# Patient Record
Sex: FEMALE | Race: WHITE | NOT HISPANIC OR LATINO | Employment: UNEMPLOYED | URBAN - NONMETROPOLITAN AREA
[De-identification: names, ages, dates, MRNs, and addresses within clinical notes are randomized per-mention and may not be internally consistent; named-entity substitution may affect disease eponyms.]

---
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
## 2024-09-17 ENCOUNTER — MEDICAL CORRESPONDENCE (OUTPATIENT)
Dept: HEALTH INFORMATION MANAGEMENT | Facility: CLINIC | Age: 24
End: 2024-09-17
Payer: COMMERCIAL

## 2024-09-17 ENCOUNTER — TRANSCRIBE ORDERS (OUTPATIENT)
Dept: OTHER | Age: 24
End: 2024-09-17

## 2024-09-17 DIAGNOSIS — M25.532 LEFT WRIST PAIN: ICD-10-CM

## 2024-09-17 DIAGNOSIS — R29.898 HAND WEAKNESS: ICD-10-CM

## 2024-09-17 DIAGNOSIS — M79.642 PAIN OF LEFT HAND: Primary | ICD-10-CM

## 2024-09-20 NOTE — TELEPHONE ENCOUNTER
Action 9/20/2024   Action Taken 1) Requested XR from [facility] via fax      REASON FOR VISIT: Pain of left hand, Left wrist pain, Hand weakness    DATE OF APPT: 10/15/2024   NOTES (FOR ALL VISITS) STATUS DETAILS   OFFICE NOTE from referring provider Received [facility]   Edgar De Jesus MD 9/17/2024   MEDICATION LIST N/A    IMAGING  (FOR ALL VISITS)     XR In process    MRI (HEAD, NECK, SPINE) N/A    CT (HEAD, NECK, SPINE) N/A

## 2024-10-07 ENCOUNTER — TELEPHONE (OUTPATIENT)
Dept: ORTHOPEDICS | Facility: CLINIC | Age: 24
End: 2024-10-07
Payer: COMMERCIAL

## 2024-10-07 NOTE — TELEPHONE ENCOUNTER
10/7 Called and left voicemail, explained we need to reschedule upcoming appointment with Dr. Aguilar and reschedule to Dr. Briggs or any other hand surgeon.     Provided phone number [phone] to reschedule.     [facility]   Orthopedics, Podiatry, Sports Medicine, Ent ,Eye , Audiology, Adult Endocrine & Diabetes, Nutrition & Medication Therapy Management Specialties   [facility]        ----- Message from Ryan HALL sent at 10/7/2024 10:26 AM CDT -----  Regarding: R/S to Dr. Briggs or other ortho hand surgeon  This Pt is on with Dr. Aguilar next week and needs to be rescheduled since its mostly wrist related. She can see Dr. Briggs or any other ortho hand surgeon, next available.     Ongoing left wrist pain, numbness and tingling, h/o ORIF left wrist 2018 ([facility])    Jose Angel Lino, RNCC

## 2024-10-09 NOTE — TELEPHONE ENCOUNTER
10/9 Called and left voicemail, explained we need to reschedule upcoming appointment with Dr. Aguilar and reschedule to Dr. Briggs or any other hand surgeon.      Provided phone number [phone] to reschedule.      [facility]   Orthopedics, Podiatry, Sports Medicine, Ent ,Eye , Audiology, Adult Endocrine & Diabetes, Nutrition & Medication Therapy Management Specialties   [facility]          Topical Retinoid counseling:  Patient advised to apply a pea-sized amount only at bedtime and wait 30 minutes after washing their face before applying.  If too drying, patient may add a non-comedogenic moisturizer. The patient verbalized understanding of the proper use and possible adverse effects of retinoids.  All of the patient's questions and concerns were addressed.

## 2024-10-10 NOTE — TELEPHONE ENCOUNTER
10/10 Called and left voicemail, explained we need to reschedule upcoming appointment with Dr. Aguilar and reschedule to Dr. Briggs or any other hand surgeon.      Provided phone number [phone] to reschedule.      [facility]   Orthopedics, Podiatry, Sports Medicine, Ent ,Eye , Audiology, Adult Endocrine & Diabetes, Nutrition & Medication Therapy Management Specialties   [facility]

## 2024-10-14 NOTE — TELEPHONE ENCOUNTER
Action 10/14/2024   Action Taken 1) Requested XR Wrist left via tax from [facility]      REASON FOR VISIT: Pain of left hand, Left wrist pain, Hand weakness    DATE OF APPT: 11/29/2024   NOTES (FOR ALL VISITS) STATUS DETAILS   OFFICE NOTE from referring provider Received [facility]  Edgar De Jesus MD 9/17/2024   EMG N/A    MEDICATION LIST N/A    IMAGING  (FOR ALL VISITS)     XR In process [facility]  XR Wrist left 8/19/2024   MRI (HEAD, NECK, SPINE) N/A    CT (HEAD, NECK, SPINE) N/A

## 2024-10-15 ENCOUNTER — PRE VISIT (OUTPATIENT)
Dept: ORTHOPEDICS | Facility: CLINIC | Age: 24
End: 2024-10-15

## 2024-11-15 NOTE — TELEPHONE ENCOUNTER
REASON FOR VISIT: Pain of left hand, Left wrist pain, Hand weakness    DATE OF APPT: 11/22/2024   NOTES (FOR ALL VISITS) STATUS DETAILS   OFFICE NOTE from referring provider Received [facility]  Edgar De Jesus MD 9/17/2024   EMG N/A    MEDICATION LIST N/A    IMAGING  (FOR ALL VISITS)     XR Received [facility]  XR Wrist left 8/19/2024   MRI (HEAD, NECK, SPINE) N/A    CT (HEAD, NECK, SPINE) N/A

## 2024-11-22 ENCOUNTER — TELEPHONE (OUTPATIENT)
Dept: ORTHOPEDICS | Facility: CLINIC | Age: 24
End: 2024-11-22

## 2024-11-22 ENCOUNTER — OFFICE VISIT (OUTPATIENT)
Dept: ORTHOPEDICS | Facility: CLINIC | Age: 24
End: 2024-11-22
Payer: COMMERCIAL

## 2024-11-22 ENCOUNTER — PRE VISIT (OUTPATIENT)
Dept: ORTHOPEDICS | Facility: CLINIC | Age: 24
End: 2024-11-22

## 2024-11-22 DIAGNOSIS — M25.532 LEFT WRIST PAIN: Primary | ICD-10-CM

## 2024-11-22 NOTE — NURSING NOTE
"Pre-Operative Teaching Flowsheet     Person(s) involved in teaching: Patient     Motivation Level:  Receptive (willing/able to accept information) and asks appropriate questions where applicable: Yes  Any cultural factors/Latter day beliefs that may influence understanding or compliance? No     Patient demonstrates understanding of the following:  Pre-operative planning, including the necessary appointments and preparation needed prior to surgery: Yes  Which situations necessitate calling provider and whom to contact: Yes  Pain management techniques pre and post op: Yes  Stoplight tool introduced, questions answered, patient expressed understanding: Yes  How, and when, to access community resources: Yes  Discussed appropriate and safe discharge to home: Yes  Patient has a designated  for surgery and \"\" to stay with them after: Yes    Additional Teaching Concerns Addressed:  Post-operative living arrangements and necessary adaptations to living environment.  Instructional Materials Used/Given: Yes, pre-op packet given to patient with additional system forms added as needed depending on type of surgery. Pre-op soap given (if in clinic).     Time spent with patient: 20 minutes.    Jose Angel Lino RNCC  "

## 2024-11-22 NOTE — TELEPHONE ENCOUNTER
Pt will likely be able to follow up locally, but we should schedule a follow up visit just in case.     Procedure: Left distal radius hardware removal  Facility:  or [facility]  Length: ? minutes  Anesthesia: ?  Post-op appointments needed: 2 weeks with surgeon or PA, 6 weeks with surgeon only.  Surgery packet/instructions given to patient?  Yes     Jose Angel Lino RNCC

## 2024-11-22 NOTE — LETTER
11/22/2024      Nataliia Sofia  [address]  [place] [address]      Dear Colleague,    Thank you for referring your patient, Nataliia Sofia, to the [facility]. Please see a copy of my visit note below.    Ortho Hand    HPI: 24 year-old ambi F 1 PPD smoker s/p remote L DR ORIF with dorsal plate p/w L wrist pain. She had a fracture of the distal radius in 2018/19. She did reasonably well and then started experiencing pain 1-year after surgery. The pain is in the dorsal wrist and constant at 3/10. It is exacerbated with activities. No paresthesias. The surgery was done by Dr. Mendoza at [facility]. No recent injuries.     ROS: Negative, see HPI  PMH: Nondiabetic  PSH: As above  Medications: No blood thinners  Allergies: None  SH: Smokes 1 PPD, can stop before surgery for 4 weeks before and after surgery  FH: No bleeding or clotting issues, or problems with anesthesia    Examination:  Nonlabored breathing  Not distressed  Tenderness over the L dorsoradial distal radius and the 3rd/4th extensor compartments and with resisted digit extension  L active wrist extension exacerbates pain and limited to 40-45 deg    XR: Healed L DR fracture with prominent dorsal plate with no evidence of hardware failure    A/P: 24F RHD S hx L DR ORIF with dorsal plate p/w symptomatic hardware, extensor tenosynovitis    -Discussed options for management. As long as the patient can stop smoking, my recommendation is plate removal with possible tenosynovectomy as needed. Explained that the plate is slightly prominent and is contributing to tendinitis that if untreated can lead to tendon rupture. Patient is agreeable with the plan.   -Discussed the risks of surgery, including but not limited to: infection, bleeding, pain, poor scarring, wound healing issues, injury to nerves and/or tendons, neuroma formation, recurrence of the condition including pain, need for revision or additional surgery, retained hardware,  iatrogenic fracture, complex regional pain syndrome, anesthesia-related complication, DVT/PE, death. Despite these risks, patient consents to LEFT wrist deep hardware removal, possible extensor tenosynovectomy. All questions answered.   -Counseled patient on smoking cessation for 4 weeks before and after surgery. Patient is agreeable.   -Case request placed  -MAC with block  -We will make sure we have the [place] Variax set available  -We will plan to use resorbable suture and have some of the follow-up virtually since [place].   -A total of 45 minutes was devoted to review of chart, direct face-to-face patient counseling and documentation during and on the day of this encounter, exclusive of any procedure performed.    Shayne Briggs MD, PhD, FACS      Again, thank you for allowing me to participate in the care of your patient.        Sincerely,        Shayne Briggs MD

## 2024-11-25 NOTE — TELEPHONE ENCOUNTER
Left voicemail regarding scheduling surgery with Dr. Briggs. Provided direct phone number for patient to call.      Petrona Zarate on 11/25/2024 at 8:50 AM

## 2024-11-25 NOTE — PROGRESS NOTES
Ortho Hand    HPI: 24 year-old ambi F 1 PPD smoker s/p remote L DR ORIF with dorsal plate p/w L wrist pain. She had a fracture of the distal radius in 2018/19. She did reasonably well and then started experiencing pain 1-year after surgery. The pain is in the dorsal wrist and constant at 3/10. It is exacerbated with activities. No paresthesias. The surgery was done by Dr. Mendoza at [facility]. No recent injuries.     ROS: Negative, see HPI  PMH: Nondiabetic  PSH: As above  Medications: No blood thinners  Allergies: None  SH: Smokes 1 PPD, can stop before surgery for 4 weeks before and after surgery  FH: No bleeding or clotting issues, or problems with anesthesia    Examination:  Nonlabored breathing  Not distressed  Tenderness over the L dorsoradial distal radius and the 3rd/4th extensor compartments and with resisted digit extension  L active wrist extension exacerbates pain and limited to 40-45 deg    XR: Healed L DR fracture with prominent dorsal plate with no evidence of hardware failure    A/P: 24F RHD S hx L DR ORIF with dorsal plate p/w symptomatic hardware, extensor tenosynovitis    -Discussed options for management. As long as the patient can stop smoking, my recommendation is plate removal with possible tenosynovectomy as needed. Explained that the plate is slightly prominent and is contributing to tendinitis that if untreated can lead to tendon rupture. Patient is agreeable with the plan.   -Discussed the risks of surgery, including but not limited to: infection, bleeding, pain, poor scarring, wound healing issues, injury to nerves and/or tendons, neuroma formation, recurrence of the condition including pain, need for revision or additional surgery, retained hardware, iatrogenic fracture, complex regional pain syndrome, anesthesia-related complication, DVT/PE, death. Despite these risks, patient consents to LEFT wrist deep hardware removal, possible extensor tenosynovectomy. All questions answered.    -Counseled patient on smoking cessation for 4 weeks before and after surgery. Patient is agreeable.   -Case request placed  -MAC with block  -We will make sure we have the [place] Variax set available  -We will plan to use resorbable suture and have some of the follow-up virtually since [place].   -A total of 45 minutes was devoted to review of chart, direct face-to-face patient counseling and documentation during and on the day of this encounter, exclusive of any procedure performed.    Shayne Briggs MD, PhD, FACS

## 2024-11-29 ENCOUNTER — PRE VISIT (OUTPATIENT)
Dept: ORTHOPEDICS | Facility: CLINIC | Age: 24
End: 2024-11-29

## 2024-11-29 PROBLEM — M25.532 LEFT WRIST PAIN: Status: ACTIVE | Noted: 2024-11-22

## 2025-02-15 ENCOUNTER — HEALTH MAINTENANCE LETTER (OUTPATIENT)
Age: 25
End: 2025-02-15

## 2025-03-26 ENCOUNTER — TELEPHONE (OUTPATIENT)
Dept: PLASTIC SURGERY | Facility: CLINIC | Age: 25
End: 2025-03-26
Payer: COMMERCIAL

## 2025-03-26 NOTE — TELEPHONE ENCOUNTER
Other: Patient has a stomach infection and is starting a 10 day antibiotic today primary said she should reschedule surgery would like to speak to care team      [facility] or would you prefer to receive a phone call?:   Patient would prefer a phone call   Okay to leave a detailed message?: Yes at Cell number on file:    Telephone Information:   Mobile [phone]

## 2025-03-27 NOTE — TELEPHONE ENCOUNTER
Plan: Left distal radius hardware removal, DOS: 4/3/25    Diagnosed with c-diff and start on abx yesterday (10 day course). I advised that her provider is correct that we do not want to do elective surgery with any active infections, especially one as contagious as c-diff. She was agreeable. Will look at rescheduling until after her abx are complete, which will also allow her to get another pre-op physical.    Jose Angel Lino, RNCC

## 2025-03-27 NOTE — TELEPHONE ENCOUNTER
Left voicemail for patient regarding rescheduling surgery with Dr. Briggs on 4/3    Provided direct contact line to discuss scheduling  P: [phone]    Gisele Brice on 3/27/2025 at 10:58 AM

## 2025-03-27 NOTE — TELEPHONE ENCOUNTER
Received voicemail from: Nataliia White voicemail for patient regarding rescheduling surgery with Dr. Briggs    Noted time is held on 4/17    Requested patient call back to discuss, provided direct line  P: [phone]    Gisele Brice on 3/27/2025 at 11:41 AM

## 2025-03-27 NOTE — TELEPHONE ENCOUNTER
Received call to reschedule surgery with: Dr. Briggs    Spoke with: Nataliia     Date of Surgery: 4/17    Estimated Arrival time Discussed with Patient:   5:45 AM    Location of surgery: [facility]    Pre-operative H&P: patient confirmed they will schedule with Edgar De Jesus MD    Post-operative Appointment w/AYDIN or Surgeon: Dr. Briggs 4/29 at 11:00 AM    Additional Appointments: N/A, no additional visits requested    Discussed with patient pre-op RN will call 2-3 days prior to surgery with arrival time and instructions:  Yes     Standard Ortho Surgery Packet: Yes  3/27/25 via Mail - Standard    All patients questions were answered and was instructed to contact the clinic with any questions or concerns.     Additional Comments: Mailing address updated, previously incorrect            Gisele Birce on 3/27/2025 at 1:54 PM

## 2025-04-16 ENCOUNTER — ANESTHESIA EVENT (OUTPATIENT)
Dept: SURGERY | Facility: AMBULATORY SURGERY CENTER | Age: 25
End: 2025-04-16
Payer: COMMERCIAL

## 2025-04-16 ASSESSMENT — LIFESTYLE VARIABLES: TOBACCO_USE: 1

## 2025-04-16 NOTE — ANESTHESIA PREPROCEDURE EVALUATION
"Anesthesia Pre-Procedure Evaluation    Patient: Nataliia Sofia   MRN: [id number] : 2000        Procedure : Procedure(s):  LEFT wrist deep hardware removal          No past medical history on file.   No past surgical history on file.   No Known Allergies   Social History     Tobacco Use     Smoking status: Not on file     Smokeless tobacco: Not on file   Substance Use Topics     Alcohol use: Not on file      Wt Readings from Last 1 Encounters:   No data found for Wt        Anesthesia Evaluation   Pt has had prior anesthetic. Type: General.        ROS/MED HX  ENT/Pulmonary:     (+)                tobacco use, Current use,                       Neurologic:  - neg neurologic ROS     Cardiovascular:  - neg cardiovascular ROS     METS/Exercise Tolerance: >4 METS    Hematologic:  - neg hematologic  ROS     Musculoskeletal:  - neg musculoskeletal ROS     GI/Hepatic: Comment: Hx of C. Diff      Renal/Genitourinary:  - neg Renal ROS     Endo:       Psychiatric/Substance Use: Comment: adhd    (+) psychiatric history anxiety   Recreational drug usage: Cannabis (3 times per week).    Infectious Disease:  - neg infectious disease ROS     Malignancy:  - neg malignancy ROS     Other:  - neg other ROS          Physical Exam    Airway        Mallampati: II   TM distance: > 3 FB   Neck ROM: full   Mouth opening: > 3 cm    Respiratory Devices and Support         Dental       (+) Completely normal teeth      Cardiovascular   cardiovascular exam normal       Rhythm and rate: regular and normal     Pulmonary   pulmonary exam normal        breath sounds clear to auscultation       OUTSIDE LABS:  CBC: No results found for: \"WBC\", \"HGB\", \"HCT\", \"PLT\"  BMP: No results found for: \"NA\", \"POTASSIUM\", \"CHLORIDE\", \"CO2\", \"BUN\", \"CR\", \"GLC\"  COAGS: No results found for: \"PTT\", \"INR\", \"FIBR\"  POC: No results found for: \"BGM\", \"HCG\", \"HCGS\"  HEPATIC: No results found for: \"ALBUMIN\", \"PROTTOTAL\", \"ALT\", \"AST\", \"GGT\", \"ALKPHOS\", \"BILITOTAL\", " "\"BILIDIRECT\", \"GEORGE\"  OTHER: No results found for: \"PH\", \"LACT\", \"A1C\", \"AUTUMN\", \"PHOS\", \"MAG\", \"LIPASE\", \"AMYLASE\", \"TSH\", \"T4\", \"T3\", \"CRP\", \"SED\"    Anesthesia Plan    ASA Status:  2    NPO Status:  NPO Appropriate    Anesthesia Type: Peripheral Nerve Block.              Consents    Anesthesia Plan(s) and associated risks, benefits, and realistic alternatives discussed. Questions answered and patient/representative(s) expressed understanding.     - Discussed:     - Discussed with:  Patient      - Extended Intubation/Ventilatory Support Discussed: No.      - Patient is DNR/DNI Status: No     Use of blood products discussed: No .     Postoperative Care    Pain management: Multi-modal analgesia.   PONV prophylaxis: Background Propofol Infusion     Comments:               Luma Gregory MD    Clinically Significant Risk Factors Present on Admission                                          "

## 2025-04-17 ENCOUNTER — ANCILLARY PROCEDURE (OUTPATIENT)
Dept: RADIOLOGY | Facility: AMBULATORY SURGERY CENTER | Age: 25
End: 2025-04-17
Attending: STUDENT IN AN ORGANIZED HEALTH CARE EDUCATION/TRAINING PROGRAM
Payer: COMMERCIAL

## 2025-04-17 ENCOUNTER — ANESTHESIA (OUTPATIENT)
Dept: SURGERY | Facility: AMBULATORY SURGERY CENTER | Age: 25
End: 2025-04-17
Payer: COMMERCIAL

## 2025-04-17 ENCOUNTER — HOSPITAL ENCOUNTER (OUTPATIENT)
Facility: AMBULATORY SURGERY CENTER | Age: 25
Discharge: HOME | End: 2025-04-17
Attending: STUDENT IN AN ORGANIZED HEALTH CARE EDUCATION/TRAINING PROGRAM
Payer: COMMERCIAL

## 2025-04-17 VITALS
HEART RATE: 78 BPM | BODY MASS INDEX: 25.69 KG/M2 | TEMPERATURE: 97 F | SYSTOLIC BLOOD PRESSURE: 150 MMHG | OXYGEN SATURATION: 93 % | DIASTOLIC BLOOD PRESSURE: 89 MMHG | WEIGHT: 145 LBS | HEIGHT: 63 IN | RESPIRATION RATE: 16 BRPM

## 2025-04-17 DIAGNOSIS — M25.532 LEFT WRIST PAIN: Primary | ICD-10-CM

## 2025-04-17 DIAGNOSIS — M25.532 LEFT WRIST PAIN: ICD-10-CM

## 2025-04-17 LAB
HCG UR QL: NEGATIVE
INTERNAL QC OK POCT: NORMAL
POCT KIT EXPIRATION DATE: NORMAL
POCT KIT LOT NUMBER: NORMAL

## 2025-04-17 PROCEDURE — 88305 TISSUE EXAM BY PATHOLOGIST: CPT | Mod: TC | Performed by: STUDENT IN AN ORGANIZED HEALTH CARE EDUCATION/TRAINING PROGRAM

## 2025-04-17 PROCEDURE — 88305 TISSUE EXAM BY PATHOLOGIST: CPT | Mod: 26 | Performed by: STUDENT IN AN ORGANIZED HEALTH CARE EDUCATION/TRAINING PROGRAM

## 2025-04-17 RX ORDER — GLYCOPYRROLATE 0.2 MG/ML
INJECTION, SOLUTION INTRAMUSCULAR; INTRAVENOUS PRN
Status: DISCONTINUED | OUTPATIENT
Start: 2025-04-17 | End: 2025-04-17

## 2025-04-17 RX ORDER — FENTANYL CITRATE 50 UG/ML
50 INJECTION, SOLUTION INTRAMUSCULAR; INTRAVENOUS EVERY 5 MIN PRN
Status: ACTIVE | OUTPATIENT
Start: 2025-04-17

## 2025-04-17 RX ORDER — ONDANSETRON 2 MG/ML
4 INJECTION INTRAMUSCULAR; INTRAVENOUS EVERY 30 MIN PRN
Status: ACTIVE | OUTPATIENT
Start: 2025-04-17

## 2025-04-17 RX ORDER — PROPOFOL 10 MG/ML
INJECTION, EMULSION INTRAVENOUS CONTINUOUS PRN
Status: DISCONTINUED | OUTPATIENT
Start: 2025-04-17 | End: 2025-04-17

## 2025-04-17 RX ORDER — OXYCODONE HYDROCHLORIDE 5 MG/1
5 TABLET ORAL
Status: ACTIVE | OUTPATIENT
Start: 2025-04-17

## 2025-04-17 RX ORDER — FENTANYL CITRATE 50 UG/ML
25-50 INJECTION, SOLUTION INTRAMUSCULAR; INTRAVENOUS
Status: ACTIVE | OUTPATIENT
Start: 2025-04-17

## 2025-04-17 RX ORDER — ACETAMINOPHEN 325 MG/1
975 TABLET ORAL ONCE
Status: COMPLETED | OUTPATIENT
Start: 2025-04-17 | End: 2025-04-17

## 2025-04-17 RX ORDER — ONDANSETRON 2 MG/ML
INJECTION INTRAMUSCULAR; INTRAVENOUS PRN
Status: DISCONTINUED | OUTPATIENT
Start: 2025-04-17 | End: 2025-04-17

## 2025-04-17 RX ORDER — SODIUM CHLORIDE, SODIUM LACTATE, POTASSIUM CHLORIDE, CALCIUM CHLORIDE 600; 310; 30; 20 MG/100ML; MG/100ML; MG/100ML; MG/100ML
INJECTION, SOLUTION INTRAVENOUS CONTINUOUS
Status: ACTIVE | OUTPATIENT
Start: 2025-04-17

## 2025-04-17 RX ORDER — OXYCODONE HYDROCHLORIDE 5 MG/1
10 TABLET ORAL
Status: ACTIVE | OUTPATIENT
Start: 2025-04-17

## 2025-04-17 RX ORDER — NALOXONE HYDROCHLORIDE 0.4 MG/ML
0.2 INJECTION, SOLUTION INTRAMUSCULAR; INTRAVENOUS; SUBCUTANEOUS
Status: ACTIVE | OUTPATIENT
Start: 2025-04-17

## 2025-04-17 RX ORDER — KETAMINE HYDROCHLORIDE 10 MG/ML
INJECTION INTRAMUSCULAR; INTRAVENOUS PRN
Status: DISCONTINUED | OUTPATIENT
Start: 2025-04-17 | End: 2025-04-17

## 2025-04-17 RX ORDER — OXYCODONE HYDROCHLORIDE 5 MG/1
5 TABLET ORAL EVERY 6 HOURS PRN
Qty: 12 TABLET | Refills: 0 | Status: SHIPPED | OUTPATIENT
Start: 2025-04-17 | End: 2025-04-20

## 2025-04-17 RX ORDER — ONDANSETRON 4 MG/1
4 TABLET, ORALLY DISINTEGRATING ORAL EVERY 30 MIN PRN
Status: ACTIVE | OUTPATIENT
Start: 2025-04-17

## 2025-04-17 RX ORDER — CEFAZOLIN SODIUM 2 G/50ML
2 SOLUTION INTRAVENOUS SEE ADMIN INSTRUCTIONS
Status: ACTIVE | OUTPATIENT
Start: 2025-04-17

## 2025-04-17 RX ORDER — BUPIVACAINE HYDROCHLORIDE AND EPINEPHRINE 5; 5 MG/ML; UG/ML
INJECTION, SOLUTION PERINEURAL
Status: COMPLETED | OUTPATIENT
Start: 2025-04-17 | End: 2025-04-17

## 2025-04-17 RX ORDER — LIDOCAINE HYDROCHLORIDE 20 MG/ML
INJECTION, SOLUTION INFILTRATION; PERINEURAL PRN
Status: DISCONTINUED | OUTPATIENT
Start: 2025-04-17 | End: 2025-04-17

## 2025-04-17 RX ORDER — HYDROMORPHONE HYDROCHLORIDE 1 MG/ML
0.2 INJECTION, SOLUTION INTRAMUSCULAR; INTRAVENOUS; SUBCUTANEOUS EVERY 5 MIN PRN
Status: ACTIVE | OUTPATIENT
Start: 2025-04-17

## 2025-04-17 RX ORDER — DEXAMETHASONE SODIUM PHOSPHATE 10 MG/ML
4 INJECTION, SOLUTION INTRAMUSCULAR; INTRAVENOUS
Status: ACTIVE | OUTPATIENT
Start: 2025-04-17

## 2025-04-17 RX ORDER — CEFAZOLIN SODIUM 2 G/50ML
2 SOLUTION INTRAVENOUS
Status: COMPLETED | OUTPATIENT
Start: 2025-04-17 | End: 2025-04-17

## 2025-04-17 RX ORDER — NALOXONE HYDROCHLORIDE 0.4 MG/ML
0.1 INJECTION, SOLUTION INTRAMUSCULAR; INTRAVENOUS; SUBCUTANEOUS
Status: ACTIVE | OUTPATIENT
Start: 2025-04-17

## 2025-04-17 RX ORDER — NALOXONE HYDROCHLORIDE 0.4 MG/ML
0.4 INJECTION, SOLUTION INTRAMUSCULAR; INTRAVENOUS; SUBCUTANEOUS
Status: ACTIVE | OUTPATIENT
Start: 2025-04-17

## 2025-04-17 RX ORDER — FENTANYL CITRATE 50 UG/ML
25 INJECTION, SOLUTION INTRAMUSCULAR; INTRAVENOUS EVERY 5 MIN PRN
Status: ACTIVE | OUTPATIENT
Start: 2025-04-17

## 2025-04-17 RX ORDER — DEXAMETHASONE SODIUM PHOSPHATE 4 MG/ML
INJECTION, SOLUTION INTRA-ARTICULAR; INTRALESIONAL; INTRAMUSCULAR; INTRAVENOUS; SOFT TISSUE PRN
Status: DISCONTINUED | OUTPATIENT
Start: 2025-04-17 | End: 2025-04-17

## 2025-04-17 RX ORDER — HYDROMORPHONE HYDROCHLORIDE 1 MG/ML
0.4 INJECTION, SOLUTION INTRAMUSCULAR; INTRAVENOUS; SUBCUTANEOUS EVERY 5 MIN PRN
Status: ACTIVE | OUTPATIENT
Start: 2025-04-17

## 2025-04-17 RX ORDER — FLUMAZENIL 0.1 MG/ML
0.2 INJECTION, SOLUTION INTRAVENOUS
Status: ACTIVE | OUTPATIENT
Start: 2025-04-17

## 2025-04-17 RX ORDER — LIDOCAINE 40 MG/G
CREAM TOPICAL
Status: ACTIVE | OUTPATIENT
Start: 2025-04-17

## 2025-04-17 RX ORDER — CEPHALEXIN 500 MG/1
500 CAPSULE ORAL 4 TIMES DAILY
Qty: 12 CAPSULE | Refills: 0 | Status: SHIPPED | OUTPATIENT
Start: 2025-04-17

## 2025-04-17 RX ORDER — ONDANSETRON 4 MG/1
4 TABLET, FILM COATED ORAL EVERY 6 HOURS PRN
Qty: 12 TABLET | Refills: 0 | Status: SHIPPED | OUTPATIENT
Start: 2025-04-17

## 2025-04-17 RX ADMIN — DEXAMETHASONE SODIUM PHOSPHATE 4 MG: 4 INJECTION, SOLUTION INTRA-ARTICULAR; INTRALESIONAL; INTRAMUSCULAR; INTRAVENOUS; SOFT TISSUE at 07:45

## 2025-04-17 RX ADMIN — GLYCOPYRROLATE 0.2 MG: 0.2 INJECTION, SOLUTION INTRAMUSCULAR; INTRAVENOUS at 07:32

## 2025-04-17 RX ADMIN — SODIUM CHLORIDE, SODIUM LACTATE, POTASSIUM CHLORIDE, CALCIUM CHLORIDE: 600; 310; 30; 20 INJECTION, SOLUTION INTRAVENOUS at 07:21

## 2025-04-17 RX ADMIN — CEFAZOLIN SODIUM 2 G: 2 SOLUTION INTRAVENOUS at 07:35

## 2025-04-17 RX ADMIN — SODIUM CHLORIDE, SODIUM LACTATE, POTASSIUM CHLORIDE, CALCIUM CHLORIDE: 600; 310; 30; 20 INJECTION, SOLUTION INTRAVENOUS at 06:52

## 2025-04-17 RX ADMIN — LIDOCAINE HYDROCHLORIDE 40 MG: 20 INJECTION, SOLUTION INFILTRATION; PERINEURAL at 07:29

## 2025-04-17 RX ADMIN — FENTANYL CITRATE 50 MCG: 50 INJECTION, SOLUTION INTRAMUSCULAR; INTRAVENOUS at 07:28

## 2025-04-17 RX ADMIN — ACETAMINOPHEN 975 MG: 325 TABLET ORAL at 06:30

## 2025-04-17 RX ADMIN — BUPIVACAINE HYDROCHLORIDE AND EPINEPHRINE 20 ML: 5; 5 INJECTION, SOLUTION PERINEURAL at 07:00

## 2025-04-17 RX ADMIN — ONDANSETRON 4 MG: 2 INJECTION INTRAMUSCULAR; INTRAVENOUS at 07:45

## 2025-04-17 RX ADMIN — FENTANYL CITRATE 50 MCG: 50 INJECTION, SOLUTION INTRAMUSCULAR; INTRAVENOUS at 06:59

## 2025-04-17 RX ADMIN — KETAMINE HYDROCHLORIDE 20 MG: 10 INJECTION INTRAMUSCULAR; INTRAVENOUS at 07:32

## 2025-04-17 RX ADMIN — PROPOFOL 150 MCG/KG/MIN: 10 INJECTION, EMULSION INTRAVENOUS at 07:28

## 2025-04-17 NOTE — ANESTHESIA PROCEDURE NOTES
Brachial plexus Procedure Note    Pre-Procedure   Staff -        Anesthesiologist:  Luma Gregory MD       Performed By: anesthesiologist       Location: pre-op       Procedure Start/Stop Times: 4/17/2025 7:00 AM and 4/17/2025 7:10 AM       Pre-Anesthestic Checklist: patient identified, IV checked, site marked, risks and benefits discussed, informed consent, monitors and equipment checked, pre-op evaluation, at physician/surgeon's request and post-op pain management  Timeout:       Correct Patient: Yes        Correct Procedure: Yes        Correct Site: Yes        Correct Position: Yes        Correct Laterality: Yes        Site Marked: Yes  Procedure Documentation  Procedure: Brachial plexus         Laterality: left       Patient Position: sitting       Skin prep: Chloraprep (supraclavicular approach).       Needle Gauge: 21.        Needle Length (Inches): 4        Ultrasound guided       1. Ultrasound was used to identify targeted nerve, plexus, vascular marker, or fascial plane and place a needle adjacent to it in real-time.       2. Ultrasound was used to visualize the spread of anesthetic in close proximity to the above referenced structure.       3. A permanent image is entered into the patient's record.    Assessment/Narrative         The placement was negative for: blood aspirated, painful injection and site bleeding       Paresthesias: No.       Bolus given via needle..        Secured via.        Insertion/Infusion Method: Single Shot    Medication(s) Administered   Bupivacaine 0.5% w/ 1:400K Epi (Injection) - Injection   15 mL - 4/17/2025 7:00:00 AM  Bupivacaine 0.5% w/ 1:200K Epi (Other) - Other   20 mL - 4/17/2025 7:00:00 AM  Bupivacaine liposome (Exparel) 1.3% LA inj susp (Infiltration) - Infiltration   10 mL - 4/17/2025 7:00:00 AM  Medication Administration Time: 4/17/2025 7:00 AM     Comments:  133mg of exparel used      FOR [facility] ONLY:   Pain Team Contact information: please page  "the Pain Team Via [facility]. Search \"Pain\". During daytime hours, please page the attending first. At night please page the resident first.      "

## 2025-04-17 NOTE — OR NURSING
Patient received left side Brachial Plexus nerve block  with Exparel.  Fentanyl 50mcg and Versed 2mg given. Tolerated procedure well.     Anjelica Jaffe RN

## 2025-04-17 NOTE — ANESTHESIA CARE TRANSFER NOTE
Patient: Nataliia Sofia    Procedure: Procedure(s):  LEFT wrist deep hardware removal, second extensor tenosynevectomy       Diagnosis: Left wrist pain [M25.532]  Diagnosis Additional Information: No value filed.    Anesthesia Type:   Peripheral Nerve Block     Note:    Oropharynx: oropharynx clear of all foreign objects  Level of Consciousness: awake  Oxygen Supplementation: room air    Independent Airway: airway patency satisfactory and stable  Dentition: dentition unchanged  Vital Signs Stable: post-procedure vital signs reviewed and stable  Report to RN Given: handoff report given  Patient transferred to: Phase II    Handoff Report: Identifed the Patient, Identified the Reponsible Provider, Reviewed the pertinent medical history, Discussed the surgical course, Reviewed Intra-OP anesthesia mangement and issues during anesthesia, Set expectations for post-procedure period and Allowed opportunity for questions and acknowledgement of understanding      Vitals:  Vitals Value Taken Time   BP     Temp 25.3  C (77.54  F) 04/17/25 0830   Pulse     Resp     SpO2 99 % 04/17/25 0830   Vitals shown include unfiled device data.    Electronically Signed By: PALLAVI Walters CRNA  April 17, 2025  8:31 AM

## 2025-04-17 NOTE — PROGRESS NOTES
Ortho Hand    No changes in history or examination.  Medically optimized.  On exam, no left hand digital or thumb extensor lag or difficulty with active extension.     OR today for left wrist deep hardware removal.    Discussed the risks of surgery, including but not limited to: infection, bleeding, pain, poor scarring, wound healing issues, injury to nerves and/or tendons, neuroma formation, recurrence of the condition including pain, need for revision or additional surgery, retained hardware, iatrogenic fracture, complex regional pain syndrome, anesthesia-related complication, DVT/PE, death. Despite these risks, patient consents to LEFT wrist deep hardware removal, possible extensor tenosynovectomy. All questions answered.     Shayne Briggs MD, PhD, FACS

## 2025-04-17 NOTE — ANESTHESIA POSTPROCEDURE EVALUATION
Patient: Nataliia Sofia    Procedure: Procedure(s):  LEFT wrist deep hardware removal, second extensor tenosynevectomy       Anesthesia Type:  Peripheral Nerve Block    Note:  Disposition: Outpatient   Postop Pain Control: Uneventful            Sign Out: Well controlled pain   PONV: No   Neuro/Psych: Uneventful            Sign Out: Acceptable/Baseline neuro status   Airway/Respiratory: Uneventful            Sign Out: Acceptable/Baseline resp. status   CV/Hemodynamics: Uneventful            Sign Out: Acceptable CV status; No obvious hypovolemia; No obvious fluid overload   Other NRE: NONE   DID A NON-ROUTINE EVENT OCCUR? No       Last vitals:  Vitals Value Taken Time   /89 04/17/25 0845   Temp 25.5  C (77.9  F) 04/17/25 0856   Pulse 78 04/17/25 0845   Resp 16 04/17/25 0845   SpO2 95 % 04/17/25 0851   Vitals shown include unfiled device data.    Electronically Signed By: Luma Gregory MD  April 17, 2025  8:57 AM

## 2025-04-17 NOTE — OP NOTE
HAND SURGERY OPERATIVE REPORT     Date of Surgery: 4/17/2025  Surgical Service: Ortho Hand     Preoperative Diagnosis:   1. Left wrist pain  2. Status post left distal radius open reduction and internal fixation with dorsal plate     Postoperative Diagnosis: Same as preoperative diagnoses     Procedures Performed:   Left dorsal distal radius plate removal (deep wrist hardware)  Left second extensor tenosynovectomy     Attending:  CARLA Briggs MD, PhD, FACS  Assistant: None     Complications: None apparent  Specimens: Left second extensor tenosynovium for permanent pathology  Implants: None  Estimated blood loss: < 5 mL  Tourniquet time: 15 minutes  Drains: None  Wound classification: Clean  Anesthesia: MAC with block     Indications for Procedure: 24-year-old female with history of left wrist fracture status post open reduction and internal fixation with a dorsal plate.  This was done in 2019.  She presents with dorsal and radial wrist pain consistent with prominent hardware and tenosynovitis.  After discussing options, my recommendation was for a left wrist deep hardware removal.    Discussed the risks of surgery, including but not limited to: infection, bleeding, pain, poor scarring, wound healing issues, injury to nerves and/or tendons, neuroma formation, recurrence of the condition including pain, need for revision or additional surgery, retained hardware, iatrogenic fracture, complex regional pain syndrome, anesthesia-related complication, DVT/PE, death. Despite these risks, patient consents to LEFT wrist deep hardware removal, possible extensor tenosynovectomy. All questions answered.      Intraoperative findings: The left dorsal distal radius plate was fully removed with no retained hardware.  The distal radius fracture was fully healed. There was tenosynovitis affecting the left second extensor compartment. The extensor carpi radialis longus appeared ruptured, but the extensor carpi radialis brevis remained  intact.  No bleeding prior to closure.     Description of Procedure: Patient was seen in the preoperative holding area.  Consent was verified.  The left wrist was marked.  All additional questions were answered.  TDiscussed the risks of surgery, including but not limited to: infection, bleeding, pain, poor scarring, wound healing issues, injury to nerves and/or tendons, neuroma formation, recurrence of the condition including pain, need for revision or additional surgery, retained hardware, iatrogenic fracture, complex regional pain syndrome, anesthesia-related complication, DVT/PE, death. Despite these risks, patient consents to LEFT wrist deep hardware removal, possible extensor tenosynovectomy. All questions answered.  A left upper extremity block was done.  Patient was then transferred to the operating room and placed supine on the operating table.  All pressure points were padded.  Sequential compression devices were placed on bilateral lower extremities and verified to be operational.  IV antibiotic prophylaxis was given.  Preinduction timeout was performed.  Monitored anesthesia care was commenced.  The left hand, wrist and forearm were prepped and draped in usual sterile fashion.  Timeout was performed.  The left hand was elevated, exsanguinated and the arm tourniquet was inflated to 250 mmHg.  Next, using the prior scar, a longitudinally oriented incision was made over the left dorsal wrist.  Once through skin, the skin flaps were sharply raised with care taken to preserve the superficial veins and any sensory branches.  A self-retaining retractor was placed.  At this point, the extensor retinaculum was identified and a stairstep incision was made in the second through the fourth extensor compartments were opened.  Of note, the second extensor compartment tendons were directly over the plate and it appeared that the extensor carpi radialis longus was ruptured. The extensor carpi radialis brevis tendon was  intact but slightly rugged. A second extensor compartment tenosynovectomy was done. The self-retaining retractor was then repositioned.  Next, the scar over the distal radius plate was incised longitudinally using a #15 blade.  Next, a freer elevator was used to elevate the scar and periosteum over the plate.  Once done, the plate was exposed fully.  At this point, the screws and the plate were removed safely with no retained hardware.  Once done, the extensor pollicis longus tendon was excluded, and the extensor retinacular flaps were reapposed and the and then repaired using interrupted figure-of-eight 3-0 Vicryl suture.  Final fluoroscopy was done to confirm all the hardware was removed. The tourniquet was taken down.  Hemostasis was obtained using bipolar electrocautery.  The skin was closed using 3-0 Monocryl deep dermal and 4-0 Monocryl intracuticular suture.  The incision was dressed with longitudinal Steri-Strips, skin adhesive, 4 x 4 gauze, cast padding and a soft dressing completed with ACE bandages. Patient tolerated the procedure with no issues and was transferred to the postanesthesia care unit with no events.     Postoperative plan: Clinic in 10-14 days.     Shayne Briggs MD, PhD, FACS

## 2025-04-17 NOTE — DISCHARGE INSTRUCTIONS
Hand Surgery Discharge Instructions    Patient has been treated for left wrist pain with Dr. Briggs on 4/17/2025.     Care: Please keep the splint/cast/dressing clean and dry at all times.  You can remove the dressing at 4-5 days postoperatively.    Medications: You can take Ibuprofen 400-800 mg and Tylenol 650 mg for pain relief. Please take each every 6 hours, and for optimal pain relief - please stagger the medications so that you are taking one or the other every 3 hours. If you had a nerve block, the effects may last 8-12 hours. If you have been prescribed additional pain medications, please take as instructed and as needed. If you are taking additional pain medications, please do not exceed 4000 mg of Tylenol daily from all sources. Also, if you are taking narcotic medications, please do not operate heavy machinery or drive. If you have been prescribed antibiotics, please also take as instructed.   [facility]  Home Care Following Anesthesia  For 24 hours after surgery:  Get plenty of rest.  A responsible adult must stay with you for at least 24 hours after you leave the surgery center.  Do not drive or use heavy equipment.  If you have weakness or tingling, don't drive or use heavy equipment until this feeling goes away.   Do not drink alcohol.   Avoid strenuous or risky activities.  Ask for help when climbing stairs.  You may feel lightheaded.  IF so, sit for a few minutes before standing.  Have someone help you get up.   If you have nausea (feel sick to your stomach): Drink only clear liquids such as apple juice, ginger ale, broth or 7-Up.  Rest may also help.  Be sure to drink enough fluids.  Move to a regular diet as you feel able.   You may have a slight fever.  Call the doctor if your fever is over 100 F (37.7 C) (taken under the tongue) or lasts longer than 24 hours.  You may have a dry mouth, a sore throat, muscle aches or trouble sleeping. These should go away  "after 24 hours.  Do not make important or legal decisions.   It is recommended to avoid smoking.        Today you received an Exparel block to numb the nerves near your surgery site.  This is a block using local anesthetic or \"numbing\" medication injected around the nerves to anesthetize or \"numb\" the area supplied by those nerves.  This block is injected into the muscle layer near your surgical site.  This medication may numb the location where you had surgery up to 72 hours.  If your surgical site is an arm or leg you should be careful with your affected limb, since it is possible to injure your limb without being aware of it due to the numbing.  Until full feeling returns, you should guard against bumping or hitting your limb, and avoid extreme hot or cold temperatures on the skin.  As the block wears off, the feeling will return as a tingling or prickly sensation near your surgical site.  You will experince more discomfort from your incision as the feeling returns.  You may want to take a pain pill (a narcotic or Tylenol if this was prescribed by your surgeon) when you start to experience mild pain before the pain beomes more severe.  If your pain medications do not control your pain, you should notify your surgeon.    Tips for taking pain medications  To get the best pain relief possible, remember these points:  Take pain medications as directed, before pain becomes severe.  Pain medication can upset your stomach: taking it with food may help.  Constipation is a common side effect of pain medication. Drink plenty of  fluids.  Eat foods high in fiber. Take a stool softener if recommended by your doctor or pharmacist.  Do not drink alcohol, drive or operate machinery while taking pain medications.  Ask about other ways to control pain, such as with heat, ice or relaxation.    Tylenol/Acetaminophen Consumption    If you feel your pain relief is insufficient, you may take Tylenol/Acetaminophen in addition to your " narcotic pain medication.   Be careful not to exceed 4,000 mg of Tylenol/Acetaminophen in a 24 hour period from all sources.  If you are taking extra strength Tylenol/acetaminophen (500 mg), the maximum dose is 8 tablets in 24 hours.  If you are taking regular strength acetaminophen (325 mg), the maximum dose is 12 tablets in 24 hours.  You were given 975mg of Tylenol at 6:30 am, you may take Tylenol again at 12:30 pm.     Call a doctor for any of the following:  Signs of infection (fever, growing tenderness at the surgery site, a large amount of drainage or bleeding, severe pain, foul-smelling drainage, redness, swelling).  It has been over 8 to 10 hours since surgery and you are still not able to urinate (pass water).  Headache for over 24 hours.  Numbness, tingling or weakness the day after surgery (if you had spinal anesthesia).  Signs of Covid-19 infection (temperature over 100 degrees, shortness of breath, cough, loss of taste/smell, generalized body aches, persistent headache, chills, sore throat, nausea/vomiting/diarrhea)    Your doctor is:       Dr. Shayne Briggs, Plastic Surgery: [phone]               After hours and weekends call the hospital @ [phone] and ask for the resident on call for:  Orthopaedics  For emergency care, call the:  [facility] Emergency Department: [phone] (TTY for hearing impaired: [phone])

## 2025-04-21 LAB
PATH REPORT.COMMENTS IMP SPEC: NORMAL
PATH REPORT.COMMENTS IMP SPEC: NORMAL
PATH REPORT.FINAL DX SPEC: NORMAL
PATH REPORT.GROSS SPEC: NORMAL
PATH REPORT.MICROSCOPIC SPEC OTHER STN: NORMAL
PATH REPORT.RELEVANT HX SPEC: NORMAL
PHOTO IMAGE: NORMAL

## 2025-04-29 ENCOUNTER — VIRTUAL VISIT (OUTPATIENT)
Dept: ORTHOPEDICS | Facility: CLINIC | Age: 25
End: 2025-04-29
Payer: COMMERCIAL

## 2025-04-29 DIAGNOSIS — M25.532 LEFT WRIST PAIN: Primary | ICD-10-CM

## 2025-04-29 NOTE — LETTER
4/29/2025      Nataliia Sofia  [address]  [place] [address]      Dear Colleague,    Thank you for referring your patient, Nataliia Sofia, to the [facility] [place]. Please see a copy of my visit note below.    Ortho Hand    Virtual video visit start: 11 AM  Virtual video visit end: 11:10 AM  Patient is taking phone call from home in [place]    No issues.  She already feels better after surgery.  She inquires about activities.    Patient can resume activities slowly over the next 4 weeks.  She should limit lifting to 15-20 pounds if possible, though she has a young child and she can lift the baby as needed.  Once the Steri-Strips fall off, patient can perform scar treatment with gentle circular massage or silicone sheeting.  She can begin soaking or swimming at approximately 5 to 6 weeks postoperatively.  She should contact us if there are any issues.  All questions answered.    Left symptomatic deep wrist hardware    Shayne Briggs MD, PhD, FACS      Again, thank you for allowing me to participate in the care of your patient.        Sincerely,        Shayne Briggs MD    Electronically signed

## 2025-04-29 NOTE — NURSING NOTE
Reason For Visit:   Chief Complaint   Patient presents with    Surgical Followup     Post op 1. Left dorsal distal radius plate removal (deep wrist hardware)  2. Left second extensor tenosynovectomy  DOS: 4/17/25       Primary MD: Edgar De Jesus  Ref. MD: Shaye    Age: 24 year old    ?  No      LMP  (LMP Unknown)       Pain Assessment  Patient Currently in Pain: Yes  0-10 Pain Scale: 2  Primary Pain Location: Hand (Left hand and wrist)  Pain Descriptors: Sore, Intermittent (Mild swelling)    Hand Dominance Evaluation  Hand Dominance: Right          QuickDASH Assessment       No data to display                   Current Outpatient Medications   Medication Sig Dispense Refill    cephALEXin (KEFLEX) 500 MG capsule Take 1 capsule (500 mg) by mouth 4 times daily. 12 capsule 0    etonogestrel (NEXPLANON) 68 MG IMPL Inject 68 mg subcutaneously.      FLUoxetine (PROZAC) 20 MG capsule       ondansetron (ZOFRAN) 4 MG tablet Take 1 tablet (4 mg) by mouth every 6 hours as needed for nausea. 12 tablet 0       Allergies   Allergen Reactions    Latex Rash    Fish-Derived Products Unknown     Sardines- sores    Flavoring Agent (Non-Screening) Other (See Comments)     Sores around mouth- butter flavoring       Lottie Nayak, ATC

## 2025-04-29 NOTE — PROGRESS NOTES
Ortho Hand    Virtual video visit start: 11 AM  Virtual video visit end: 11:10 AM  Patient is taking phone call from home in [place]    No issues.  She already feels better after surgery.  She inquires about activities.    Patient can resume activities slowly over the next 4 weeks.  She should limit lifting to 15-20 pounds if possible, though she has a young child and she can lift the baby as needed.  Once the Steri-Strips fall off, patient can perform scar treatment with gentle circular massage or silicone sheeting.  She can begin soaking or swimming at approximately 5 to 6 weeks postoperatively.  She should contact us if there are any issues.  All questions answered.    Left symptomatic deep wrist hardware    Shayne Briggs MD, PhD, FACS

## (undated) RX ORDER — ACETAMINOPHEN 325 MG/1
TABLET ORAL
Status: DISPENSED
Start: 2025-04-17

## (undated) RX ORDER — GLYCOPYRROLATE 0.2 MG/ML
INJECTION, SOLUTION INTRAMUSCULAR; INTRAVENOUS
Status: DISPENSED
Start: 2025-04-17

## (undated) RX ORDER — CEFAZOLIN SODIUM 2 G/50ML
SOLUTION INTRAVENOUS
Status: DISPENSED
Start: 2025-04-17

## (undated) RX ORDER — FENTANYL CITRATE 50 UG/ML
INJECTION, SOLUTION INTRAMUSCULAR; INTRAVENOUS
Status: DISPENSED
Start: 2025-04-17

## (undated) RX ORDER — DEXAMETHASONE SODIUM PHOSPHATE 4 MG/ML
INJECTION, SOLUTION INTRA-ARTICULAR; INTRALESIONAL; INTRAMUSCULAR; INTRAVENOUS; SOFT TISSUE
Status: DISPENSED
Start: 2025-04-17

## (undated) RX ORDER — ONDANSETRON 2 MG/ML
INJECTION INTRAMUSCULAR; INTRAVENOUS
Status: DISPENSED
Start: 2025-04-17

## (undated) RX ORDER — PROPOFOL 10 MG/ML
INJECTION, EMULSION INTRAVENOUS
Status: DISPENSED
Start: 2025-04-17